# Patient Record
Sex: FEMALE | Race: WHITE | NOT HISPANIC OR LATINO | Employment: OTHER | URBAN - METROPOLITAN AREA
[De-identification: names, ages, dates, MRNs, and addresses within clinical notes are randomized per-mention and may not be internally consistent; named-entity substitution may affect disease eponyms.]

---

## 2017-01-12 ENCOUNTER — TELEPHONE (OUTPATIENT)
Dept: GASTROENTEROLOGY | Facility: CLINIC | Age: 72
End: 2017-01-12

## 2017-01-12 NOTE — TELEPHONE ENCOUNTER
Spoke with patient's daughter. EUS canceled from 1/19 due to a family illness. Did not want to reschedule at this time. Will call back to schedule.

## 2017-01-17 ENCOUNTER — PATIENT MESSAGE (OUTPATIENT)
Dept: HEPATOLOGY | Facility: CLINIC | Age: 72
End: 2017-01-17

## 2017-01-24 ENCOUNTER — TELEPHONE (OUTPATIENT)
Dept: HEPATOLOGY | Facility: CLINIC | Age: 72
End: 2017-01-24

## 2017-01-24 NOTE — TELEPHONE ENCOUNTER
"Please attempt to call and/or daughter. Pt needs to do labs locally to check her kidney functioning. I have attempted to call and send my [facility] msg that has not been read.    "Has she gone to do her repeat labs to check her kidney functioning? If not, we need to get that done asap. Please let me know if you went do lab and where so we can get the results."    Pt needs to go do labs if she has not already and if she has please call for these results.  "

## 2017-01-25 ENCOUNTER — PATIENT MESSAGE (OUTPATIENT)
Dept: HEPATOLOGY | Facility: CLINIC | Age: 72
End: 2017-01-25

## 2017-01-25 NOTE — TELEPHONE ENCOUNTER
Please tell pt that it is very important for her to go do her labs to check her kidney functioning. She can go into kidney failure possibly if we don't monitor this. I want make sure her levels are better. Please go do lab this week.

## 2017-01-26 ENCOUNTER — TELEPHONE (OUTPATIENT)
Dept: HEPATOLOGY | Facility: CLINIC | Age: 72
End: 2017-01-26

## 2017-01-26 DIAGNOSIS — R76.8 HEPATITIS C ANTIBODY TEST POSITIVE: ICD-10-CM

## 2017-01-26 DIAGNOSIS — R60.0 BILATERAL LEG EDEMA: ICD-10-CM

## 2017-01-26 DIAGNOSIS — E88.09 HYPOALBUMINEMIA: ICD-10-CM

## 2017-01-26 DIAGNOSIS — L03.90 CELLULITIS, UNSPECIFIED CELLULITIS SITE: ICD-10-CM

## 2017-01-26 DIAGNOSIS — E85.4 CUTANEOUS AMYLOIDOSIS: ICD-10-CM

## 2017-01-26 DIAGNOSIS — R18.8 OTHER ASCITES: ICD-10-CM

## 2017-01-26 DIAGNOSIS — K76.6 PORTAL HYPERTENSION: ICD-10-CM

## 2017-01-26 DIAGNOSIS — J90 PLEURAL EFFUSION, RIGHT: ICD-10-CM

## 2017-01-26 DIAGNOSIS — I50.32 CHRONIC DIASTOLIC CHF (CONGESTIVE HEART FAILURE): ICD-10-CM

## 2017-01-26 DIAGNOSIS — K74.60 CIRRHOSIS OF LIVER WITHOUT ASCITES, UNSPECIFIED HEPATIC CIRRHOSIS TYPE: ICD-10-CM

## 2017-01-26 DIAGNOSIS — K74.3 PBC (PRIMARY BILIARY CIRRHOSIS): ICD-10-CM

## 2017-01-26 DIAGNOSIS — L99 CUTANEOUS AMYLOIDOSIS: ICD-10-CM

## 2017-01-26 NOTE — TELEPHONE ENCOUNTER
----- Message from Maribell Dawson sent at 1/25/2017  7:55 PM CST -----  Contact: Pt's son, Jarrod  Pt's son, Jarrod is calling to figure out lab work between [facility] and PCP.  If orders need to be sent over to PCP and labs can be done in [place], orders can be faxed to [phone].    Jarrod can be reached at [phone].  Thank You

## 2017-01-26 NOTE — TELEPHONE ENCOUNTER
Spoke with Aissatou from pt's PCP office. She confirmed fax received (pt office notes, lab results and orders to get labs done).

## 2017-01-27 RX ORDER — SPIRONOLACTONE 100 MG/1
TABLET, FILM COATED ORAL
Qty: 30 TABLET | Refills: 2 | Status: SHIPPED | OUTPATIENT
Start: 2017-01-27 | End: 2017-03-27 | Stop reason: SDUPTHER

## 2017-01-30 ENCOUNTER — PATIENT MESSAGE (OUTPATIENT)
Dept: HEPATOLOGY | Facility: CLINIC | Age: 72
End: 2017-01-30

## 2017-02-06 ENCOUNTER — DOCUMENTATION ONLY (OUTPATIENT)
Dept: HEPATOLOGY | Facility: CLINIC | Age: 72
End: 2017-02-06

## 2017-02-06 NOTE — PROGRESS NOTES
Outside labs dated 1/31/17:    CR 1.06  K 4.7    Pt notified via [facility] that her Cr was back to normal.

## 2017-03-02 ENCOUNTER — TELEPHONE (OUTPATIENT)
Dept: GASTROENTEROLOGY | Facility: CLINIC | Age: 72
End: 2017-03-02

## 2017-03-07 ENCOUNTER — TELEPHONE (OUTPATIENT)
Dept: GASTROENTEROLOGY | Facility: CLINIC | Age: 72
End: 2017-03-07

## 2017-03-07 NOTE — TELEPHONE ENCOUNTER
Spoke with patient's daughter in regards to EUS. She does not wish to pursue EUS at this time. She has followed up with her local MD and was told everything was fine. Will call if they decide in the future to have EUS

## 2017-03-27 ENCOUNTER — OFFICE VISIT (OUTPATIENT)
Dept: HEPATOLOGY | Facility: CLINIC | Age: 72
End: 2017-03-27
Payer: MEDICARE

## 2017-03-27 ENCOUNTER — HOSPITAL ENCOUNTER (OUTPATIENT)
Dept: RADIOLOGY | Facility: HOSPITAL | Age: 72
Discharge: HOME | End: 2017-03-27
Attending: NURSE PRACTITIONER
Payer: MEDICARE

## 2017-03-27 VITALS
WEIGHT: 147 LBS | BODY MASS INDEX: 23.63 KG/M2 | RESPIRATION RATE: 20 BRPM | HEIGHT: 66 IN | DIASTOLIC BLOOD PRESSURE: 63 MMHG | SYSTOLIC BLOOD PRESSURE: 130 MMHG | OXYGEN SATURATION: 97 % | HEART RATE: 62 BPM

## 2017-03-27 DIAGNOSIS — E85.4 CUTANEOUS AMYLOIDOSIS: ICD-10-CM

## 2017-03-27 DIAGNOSIS — K74.3 PBC (PRIMARY BILIARY CIRRHOSIS): ICD-10-CM

## 2017-03-27 DIAGNOSIS — K74.60 CIRRHOSIS OF LIVER WITH ASCITES, UNSPECIFIED HEPATIC CIRRHOSIS TYPE: ICD-10-CM

## 2017-03-27 DIAGNOSIS — E88.09 HYPOALBUMINEMIA: ICD-10-CM

## 2017-03-27 DIAGNOSIS — K76.6 PORTAL HYPERTENSION: ICD-10-CM

## 2017-03-27 DIAGNOSIS — K86.2 PANCREATIC CYST: ICD-10-CM

## 2017-03-27 DIAGNOSIS — K74.60 CIRRHOSIS OF LIVER WITHOUT ASCITES, UNSPECIFIED HEPATIC CIRRHOSIS TYPE: Primary | ICD-10-CM

## 2017-03-27 DIAGNOSIS — R18.8 CIRRHOSIS OF LIVER WITH ASCITES, UNSPECIFIED HEPATIC CIRRHOSIS TYPE: ICD-10-CM

## 2017-03-27 DIAGNOSIS — R76.8 HEPATITIS C ANTIBODY TEST POSITIVE: ICD-10-CM

## 2017-03-27 DIAGNOSIS — R18.8 OTHER ASCITES: ICD-10-CM

## 2017-03-27 DIAGNOSIS — I85.10 SECONDARY ESOPHAGEAL VARICES WITHOUT BLEEDING: ICD-10-CM

## 2017-03-27 DIAGNOSIS — R60.0 BILATERAL LEG EDEMA: ICD-10-CM

## 2017-03-27 DIAGNOSIS — K76.82 HEPATIC ENCEPHALOPATHY: ICD-10-CM

## 2017-03-27 DIAGNOSIS — L99 CUTANEOUS AMYLOIDOSIS: ICD-10-CM

## 2017-03-27 DIAGNOSIS — I86.4 GASTRIC VARICES: ICD-10-CM

## 2017-03-27 PROCEDURE — 99999 PR PBB SHADOW E&M-EST. PATIENT-LVL III: CPT | Mod: PBBFAC,,, | Performed by: NURSE PRACTITIONER

## 2017-03-27 PROCEDURE — 76700 US EXAM ABDOM COMPLETE: CPT | Mod: TC

## 2017-03-27 PROCEDURE — 99214 OFFICE O/P EST MOD 30 MIN: CPT | Mod: S$GLB,,, | Performed by: NURSE PRACTITIONER

## 2017-03-27 PROCEDURE — 76700 US EXAM ABDOM COMPLETE: CPT | Mod: 26,,, | Performed by: RADIOLOGY

## 2017-03-27 RX ORDER — FUROSEMIDE 40 MG/1
40 TABLET ORAL 2 TIMES DAILY
Qty: 60 TABLET | Refills: 5 | Status: SHIPPED | OUTPATIENT
Start: 2017-03-27 | End: 2017-09-28 | Stop reason: SDUPTHER

## 2017-03-27 RX ORDER — SPIRONOLACTONE 100 MG/1
100 TABLET, FILM COATED ORAL DAILY
Qty: 30 TABLET | Refills: 5 | Status: SHIPPED | OUTPATIENT
Start: 2017-03-27 | End: 2017-10-19 | Stop reason: SDUPTHER

## 2017-03-27 RX ORDER — URSODIOL 500 MG/1
500 TABLET, FILM COATED ORAL 2 TIMES DAILY
Qty: 60 TABLET | Refills: 11 | Status: SHIPPED | OUTPATIENT
Start: 2017-03-27 | End: 2018-03-27

## 2017-03-27 RX ORDER — LACTULOSE 10 G/15ML
20 SOLUTION ORAL 3 TIMES DAILY
Qty: 8100 ML | Refills: 3 | Status: SHIPPED | OUTPATIENT
Start: 2017-03-27

## 2017-03-27 NOTE — PROGRESS NOTES
[facility] Established Patient Visit    Reason for Visit:  F/u PBC, cirrhosis    PCP: Suresh Espana    HPI:  This is a 71 y.o. 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 She is here with her son and daughter today. She has been decompensated with fluid retention that is stable and well-controlled on lasix 40 mg BID and spironolactone 100 mg daily. She developed hepatic encephalopathy requiring hospitalization in 5/2016 and was started on lactulose and Xifaxan and her encephalopathy remains well-controlled. She is on ursodiol 500 mg BID. Her alk phos and transaminases remain mildly elevated though. MELD today is 10. She had labs and u/s today for HCC surveillance. U/s showed no liver masses but she does have 2 liver cysts. She has pancreatic cysts also and was supposed to undergo EUS for these but elected to defer procedure for now. One new pancreas cyst in panc head seen today.     Her EGD on 7/26/16 showed Class 1 varices starting at 30 cm. Mild gastric varices seen also. Esophageal varices not banded d/t presence of gastric varices. She will need repeat EGD in 7/2016 so we can do EUS and EGD together. She is on Coreg 6.25 mg BID. She has osteopenia and is on Ca and vit D. She has been vaccinated for hep B and is immune to hep A per labs.      MELD-Na score: 10 at 3/27/2017  9:50 AM  MELD score: 10 at 3/27/2017  9:50 AM  Calculated from:  Serum Creatinine: 1.2 mg/dL at 3/27/2017  9:50 AM  Serum Sodium: 142 mmol/L (Rounded to 137) at 3/27/2017  9:50 AM  Total Bilirubin: 1.2 mg/dL at 3/27/2017  9:50 AM  INR(ratio): 1.1 at 3/27/2017  9:50 AM  Age: 71 years    She feels well today. Denies jaundice, dark urine, hematemesis, melena, slowed mentation, abdominal distention.     ROS:   GENERAL: Denies fever, chills, weight change, fatigue  HEENT: Denies headaches, dizziness, vision/hearing changes  CARDIOVASCULAR: Denies chest pain, palpitations, edema  RESPIRATORY: Denies dyspnea,  "cough  GI: Denies abdominal pain, rectal bleeding, nausea, vomiting. 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    PSHX:  has a past surgical history that includes Tonsillectomy; Incisional hernia repair; Hysterectomy; breast implant; Colonoscopy; and Upper gastrointestinal endoscopy.    The patient's social and family histories were reviewed by me and updated in the appropriate section of the electronic medical record.    Review of patient's allergies indicates:   Allergen Reactions    Bactrim [sulfamethoxazole-trimethoprim] Rash       Medications reviewed in Epic      Objective Findings:    PHYSICAL EXAM:   Friendly White female, in no acute distress; alert and oriented to person, place and time  VITALS:   /63 (BP Location: Right arm, Patient Position: Sitting, BP Method: Automatic)  Pulse 62  Resp 20  Ht 5' 6" (1.676 m)  Wt 66.7 kg (147 lb)  SpO2 97%  BMI 23.73 kg/m2  HENT: Normocephalic, without obvious abnormality. Oral mucosa pink and moist. Dentition good.  EYES: Sclerae anicteric. No conjunctival pallor.   CARDIOVASCULAR: Regular rate and rhythm. No murmurs.  RESPIRATORY: Normal respiratory effort. BBS CTA. No wheezes or crackles.  GI: Soft, non-tender, non-distended. No hepatosplenomegaly. No masses palpable. No ascites.  EXTREMITIES:  No clubbing, cyanosis or edema.  SKIN: Warm and dry. No jaundice. No rashes noted to exposed skin. (+) telangectasias noted. (+) palmar erythema.  NEURO:  Normal gate. (+) mild tremor.  PSYCH:  Memory intact. Thought and speech pattern appropriate. Behavior normal. No depression or anxiety " noted.      Labs:  Lab Results   Component Value Date    WBC 6.91 03/27/2017    HGB 13.2 03/27/2017    HCT 37.6 03/27/2017    PLT 79 (L) 03/27/2017    CHOL 113 (L) 04/21/2016    TRIG 86 04/21/2016    HDL 15 (L) 04/21/2016     03/27/2017    K 4.5 03/27/2017    CREATININE 1.2 03/27/2017    ALT 34 03/27/2017    AST 45 (H) 03/27/2017    ALKPHOS 146 (H) 03/27/2017    BILITOT 1.2 (H) 03/27/2017    ALBUMIN 3.2 (L) 03/27/2017    INR 1.1 03/27/2017    AFP 2.2 03/27/2017       ASSESSMENT:  71 y.o. White female with:  1.  Cirrhosis, decompensated but stable on meds  -- MELD= MELD-Na score: 10 at 3/27/2017  9:50 AM  MELD score: 10 at 3/27/2017  9:50 AM  Calculated from:  Serum Creatinine: 1.2 mg/dL at 3/27/2017  9:50 AM  Serum Sodium: 142 mmol/L (Rounded to 137) at 3/27/2017  9:50 AM  Total Bilirubin: 1.2 mg/dL at 3/27/2017  9:50 AM  INR(ratio): 1.1 at 3/27/2017  9:50 AM  Age: 71 years  -- HCC screening- AFP and abd. U/S- up to date, next due 9/2017  -- Immunity to Hep A and B- (+) HAV immunity, s/p HBV vaccines  -- EGD- up to date, see below  2.  PBC  -- on ursodiol 500 mg BID  -- liver biopsy: none  -- liver enzymes: alk phos and transaminases remain mildly elevated  -- AMA (+) 1:320  -- Dexa scan 6/2016 - osteopenia  3. Portal hypertension  -- EGD- 7/26/16 - Class 1 varices starting at 30 cm. Mild gastric varices seen also. Esophageal varices not banded d/t presence of gastric varices. Repeat in one year, on Coreg 6.25 mg BID  -- Ascites, ankle edema- on lasix 40 mg BID and spironolactone 100 mg daily, stable  -- Splenomegaly and thrombocytopenia  4. Hepatic encephalopathy, stable on lactulose and Xifaxan   5. Amyloidosis  -- only skin involvement per pt and family, evaluated with outside hospital  6. Hepatitis C Ab (+)/grayzone  -- HCV RNA negative indicating no active/chronic infection  7. Pancreatic cysts  -- was scheduled for EUS in 1/2017 but pt deferred this      EDUCATION:   Discussed management, prognosis, and  pathophysiology of PBC.   -- Avoid alcohol. Avoid raw seafood.   -- Natural history of PBC including progression to cirrhosis reviewed.   -- Discussed potential outcomes of cirrhosis, including liver cancer, liver failure, liver transplant, death.   -- Limit acetaminophen to 2000mg daily.  -- Discussed increased incidence of bone loss/osteoporosis with PBC. Recommend adequate Ca and vitamin D supplementation (Ca 7894-4546 mg and vitamin D 800-1000 mg daily).     The disease process and manifestations of cirrhosis were discussed.    Signs and symptoms of hepatic decompensation were reviewed, including jaundice, ascites, and slowed mentation due to hepatic encephalopathy. The patient should seek medical attention if any of these things occur. We discussed the potential for bleeding from esophageal varices with symptoms of hematemesis and melena. The patient should report to the Emergency Department for these symptoms.    We discussed the increased risk of hepatocellular carcinoma due to cirrhosis. Continued screening every six months with ultrasound and AFP is recommended.   No alcohol or raw seafood.    Tylenol/acetaminophen as needed for pain, up to 2000 mg daily    Discussed use of the MELD score and its role in the management and determining the prognosis of cirrhosis. Currently MELD score is not high enough to warrant transplant evaluation. Will continue to monitor.       PLAN:  1. Refilled lasix, spironolactone, lactulose, ursodiol  2. Continue same doses of all meds  3. HCC screening Q 6 months with AFP and abd. U/S, next due 9/2017  4. Repeat EGD in 7/2017. Will do EUS with EGD for her panc cysts and varices screening   5. Continue calcium and vit D supplementation  6. Repeat Dexa 6/2018  7. F/u in 7/2017 either same day with EUS/EGD or one day before or after       Thank you for allowing me to participate in the care of Andie Bills    Jenny Quinoens, NP-C     CC: Dr. Rich Paniagua, [facility]  LAURI Espana MD, PCP  Dr. Kalin Ambrosio, GI

## 2017-03-30 ENCOUNTER — TELEPHONE (OUTPATIENT)
Dept: GASTROENTEROLOGY | Facility: CLINIC | Age: 72
End: 2017-03-30

## 2017-04-05 ENCOUNTER — TELEPHONE (OUTPATIENT)
Dept: GASTROENTEROLOGY | Facility: CLINIC | Age: 72
End: 2017-04-05

## 2017-04-24 ENCOUNTER — TELEPHONE (OUTPATIENT)
Dept: GASTROENTEROLOGY | Facility: CLINIC | Age: 72
End: 2017-04-24

## 2017-05-01 ENCOUNTER — TELEPHONE (OUTPATIENT)
Dept: GASTROENTEROLOGY | Facility: CLINIC | Age: 72
End: 2017-05-01

## 2017-05-09 ENCOUNTER — TELEPHONE (OUTPATIENT)
Dept: GASTROENTEROLOGY | Facility: CLINIC | Age: 72
End: 2017-05-09

## 2017-05-11 ENCOUNTER — TELEPHONE (OUTPATIENT)
Dept: GASTROENTEROLOGY | Facility: CLINIC | Age: 72
End: 2017-05-11

## 2017-06-25 ENCOUNTER — PATIENT MESSAGE (OUTPATIENT)
Dept: HEPATOLOGY | Facility: CLINIC | Age: 72
End: 2017-06-25

## 2017-06-27 ENCOUNTER — TELEPHONE (OUTPATIENT)
Dept: ENDOSCOPY | Facility: HOSPITAL | Age: 72
End: 2017-06-27

## 2017-06-27 DIAGNOSIS — I85.00 ESOPHAGEAL VARICES WITHOUT BLEEDING, UNSPECIFIED ESOPHAGEAL VARICES TYPE: Primary | ICD-10-CM

## 2017-07-17 ENCOUNTER — PATIENT MESSAGE (OUTPATIENT)
Dept: HEPATOLOGY | Facility: CLINIC | Age: 72
End: 2017-07-17

## 2017-07-25 ENCOUNTER — TELEPHONE (OUTPATIENT)
Dept: HEPATOLOGY | Facility: CLINIC | Age: 72
End: 2017-07-25

## 2017-07-25 NOTE — TELEPHONE ENCOUNTER
----- Message from Leelee Raudel sent at 7/25/2017  8:42 AM CDT -----  Contact: daughter/Leelee  Very swollen all over body, and she can hardly walk daughter needs a call back so she can figure out what to do, please call her @ # [phone] she has a procedure tomorrow does not think she will be able to do it.

## 2017-07-25 NOTE — TELEPHONE ENCOUNTER
Returned call spoke with patient's daughter Leelee. Leelee says her mother has swelling in her abdomen and legs. Says she saw her doctor today and had an ABD  U/S and labs done. Leelee says she informed the doctor that the patient will coming to [facility] on tomorrow 7/26/17 for procedure and no further test or procedures were done. Also says she may have to cancel her mother's appointment.  Asked Leelee if patient is still having swelling, if she has any pain or SOB. Patient's daughter says her mother still has swelling and a little SOB, but she 's not in any pain.  Informed daughter that she should bring her mother to the ER to be assessed. Also attempted to give her the phone number for the Endoscopy Department  In case she needed to cancel the appointment. Leelee says she can not take the phone number because she's driving. Informed Leelee that I can call her number and leave phone number on answering machine.   Leelee verbalizes understanding, says thank you . Leelee's cell phone called and left phone number for the Endoscopy Department.

## 2017-07-26 ENCOUNTER — TELEPHONE (OUTPATIENT)
Dept: HEPATOLOGY | Facility: CLINIC | Age: 72
End: 2017-07-26

## 2017-07-26 ENCOUNTER — TELEPHONE (OUTPATIENT)
Dept: ENDOSCOPY | Facility: HOSPITAL | Age: 72
End: 2017-07-26

## 2017-07-26 NOTE — TELEPHONE ENCOUNTER
Called and spoke with patient's daughter Leelee. Informed her that I sent a message to provider regarding her mother's symptoms. Relayed message from KRISTIN Mccurdy, informed her that provider would like patient to go to ER to be assessed, and she will call her when she return to clinic on next week.   Patient's daughter Leelee says she brought her mother to the ER this morning. And they are going to admit her, asked Leelee if she knew what diagnosis they were admitting her with. Leelee says no they are admitting her now.

## 2017-07-26 NOTE — TELEPHONE ENCOUNTER
Pt daughter called and stated Pt was admitted to hospital and EGD/EUS is to be canceled for 7/26/17 at 1300, Caren RN charge nurse on 2nd floor endoscopy notified.

## 2017-07-31 NOTE — TELEPHONE ENCOUNTER
Called and spoke with pt. She reports her swelling is much better since being hospitalized. Attempted to call pt's daughter. No answer. Left VM.    Pt is due for f/u appt with me with labs for CBC, CMP, INR. Please call to get these scheduled.

## 2017-08-01 NOTE — TELEPHONE ENCOUNTER
MA called patient, able to speak to the cleaning lady in her house saying she is outside walking. Left her message to have the patient or daughter call us back to schedule her follow up visit and labs. QUENTIN

## 2017-08-02 NOTE — TELEPHONE ENCOUNTER
MA called patient again, schedule her labs and follow up visit 8/11 mailed appt reminder to patient. Christophe

## 2017-08-14 ENCOUNTER — TELEPHONE (OUTPATIENT)
Dept: ENDOSCOPY | Facility: HOSPITAL | Age: 72
End: 2017-08-14

## 2017-08-14 ENCOUNTER — OFFICE VISIT (OUTPATIENT)
Dept: HEPATOLOGY | Facility: CLINIC | Age: 72
End: 2017-08-14
Payer: MEDICARE

## 2017-08-14 ENCOUNTER — LAB VISIT (OUTPATIENT)
Dept: LAB | Facility: HOSPITAL | Age: 72
End: 2017-08-14
Payer: MEDICARE

## 2017-08-14 VITALS
TEMPERATURE: 97 F | HEIGHT: 66 IN | SYSTOLIC BLOOD PRESSURE: 137 MMHG | WEIGHT: 155.63 LBS | BODY MASS INDEX: 25.01 KG/M2 | DIASTOLIC BLOOD PRESSURE: 60 MMHG | HEART RATE: 73 BPM | RESPIRATION RATE: 18 BRPM | OXYGEN SATURATION: 97 %

## 2017-08-14 DIAGNOSIS — M25.473 ANKLE EDEMA: ICD-10-CM

## 2017-08-14 DIAGNOSIS — K74.3 PRIMARY BILIARY CHOLANGITIS: ICD-10-CM

## 2017-08-14 DIAGNOSIS — K74.60 CIRRHOSIS OF LIVER WITHOUT ASCITES, UNSPECIFIED HEPATIC CIRRHOSIS TYPE: Primary | ICD-10-CM

## 2017-08-14 DIAGNOSIS — E85.4 CUTANEOUS AMYLOIDOSIS: ICD-10-CM

## 2017-08-14 DIAGNOSIS — K74.60 CIRRHOSIS OF LIVER WITH ASCITES, UNSPECIFIED HEPATIC CIRRHOSIS TYPE: ICD-10-CM

## 2017-08-14 DIAGNOSIS — K76.82 HEPATIC ENCEPHALOPATHY: ICD-10-CM

## 2017-08-14 DIAGNOSIS — R18.8 OTHER ASCITES: ICD-10-CM

## 2017-08-14 DIAGNOSIS — L99 CUTANEOUS AMYLOIDOSIS: ICD-10-CM

## 2017-08-14 DIAGNOSIS — K76.6 PORTAL HYPERTENSION: ICD-10-CM

## 2017-08-14 DIAGNOSIS — K86.2 PANCREATIC CYST: ICD-10-CM

## 2017-08-14 DIAGNOSIS — R18.8 CIRRHOSIS OF LIVER WITH ASCITES, UNSPECIFIED HEPATIC CIRRHOSIS TYPE: ICD-10-CM

## 2017-08-14 DIAGNOSIS — J90 PLEURAL EFFUSION, RIGHT: ICD-10-CM

## 2017-08-14 LAB
AFP SERPL-MCNC: 2 NG/ML
ALBUMIN SERPL BCP-MCNC: 2.7 G/DL
ALP SERPL-CCNC: 149 U/L
ALT SERPL W/O P-5'-P-CCNC: 39 U/L
ANION GAP SERPL CALC-SCNC: 9 MMOL/L
AST SERPL-CCNC: 52 U/L
BASOPHILS # BLD AUTO: 0.06 K/UL
BASOPHILS NFR BLD: 0.9 %
BILIRUB SERPL-MCNC: 1.5 MG/DL
BUN SERPL-MCNC: 56 MG/DL
CALCIUM SERPL-MCNC: 9.1 MG/DL
CHLORIDE SERPL-SCNC: 107 MMOL/L
CO2 SERPL-SCNC: 23 MMOL/L
CREAT SERPL-MCNC: 1.3 MG/DL
DIFFERENTIAL METHOD: ABNORMAL
EOSINOPHIL # BLD AUTO: 0.5 K/UL
EOSINOPHIL NFR BLD: 7.1 %
ERYTHROCYTE [DISTWIDTH] IN BLOOD BY AUTOMATED COUNT: 15.2 %
EST. GFR  (AFRICAN AMERICAN): 47 ML/MIN/1.73 M^2
EST. GFR  (NON AFRICAN AMERICAN): 40.8 ML/MIN/1.73 M^2
GLUCOSE SERPL-MCNC: 148 MG/DL
HCT VFR BLD AUTO: 35.5 %
HGB BLD-MCNC: 11.9 G/DL
INR PPP: 1.2
LYMPHOCYTES # BLD AUTO: 1 K/UL
LYMPHOCYTES NFR BLD: 15.9 %
MCH RBC QN AUTO: 33.5 PG
MCHC RBC AUTO-ENTMCNC: 33.5 G/DL
MCV RBC AUTO: 100 FL
MONOCYTES # BLD AUTO: 0.5 K/UL
MONOCYTES NFR BLD: 8 %
NEUTROPHILS # BLD AUTO: 4.3 K/UL
NEUTROPHILS NFR BLD: 68.1 %
PLATELET # BLD AUTO: 83 K/UL
PMV BLD AUTO: 12.2 FL
POTASSIUM SERPL-SCNC: 4.3 MMOL/L
PROT SERPL-MCNC: 6.7 G/DL
PROTHROMBIN TIME: 12.6 SEC
RBC # BLD AUTO: 3.55 M/UL
SODIUM SERPL-SCNC: 139 MMOL/L
WBC # BLD AUTO: 6.37 K/UL

## 2017-08-14 PROCEDURE — 1159F MED LIST DOCD IN RCRD: CPT | Mod: S$GLB,,, | Performed by: NURSE PRACTITIONER

## 2017-08-14 PROCEDURE — 3008F BODY MASS INDEX DOCD: CPT | Mod: S$GLB,,, | Performed by: NURSE PRACTITIONER

## 2017-08-14 PROCEDURE — 99214 OFFICE O/P EST MOD 30 MIN: CPT | Mod: S$GLB,,, | Performed by: NURSE PRACTITIONER

## 2017-08-14 PROCEDURE — 99999 PR PBB SHADOW E&M-EST. PATIENT-LVL IV: CPT | Mod: PBBFAC,,, | Performed by: NURSE PRACTITIONER

## 2017-08-14 PROCEDURE — 1126F AMNT PAIN NOTED NONE PRSNT: CPT | Mod: S$GLB,,, | Performed by: NURSE PRACTITIONER

## 2017-08-14 NOTE — TELEPHONE ENCOUNTER
Spoke with patient's daughter, Lorie, about rescheduling EGD/UEUS.  States she will call back today after appt w/Jenny Quinones NP in Hepatology.

## 2017-08-15 ENCOUNTER — TELEPHONE (OUTPATIENT)
Dept: ENDOSCOPY | Facility: HOSPITAL | Age: 72
End: 2017-08-15

## 2017-08-15 DIAGNOSIS — I86.4 ESOPHAGEAL AND GASTRIC VARICES: ICD-10-CM

## 2017-08-15 DIAGNOSIS — I85.00 ESOPHAGEAL AND GASTRIC VARICES: ICD-10-CM

## 2017-08-15 DIAGNOSIS — K74.60 CIRRHOSIS OF LIVER WITHOUT ASCITES, UNSPECIFIED HEPATIC CIRRHOSIS TYPE: Primary | ICD-10-CM

## 2017-08-15 NOTE — TELEPHONE ENCOUNTER
Please call pt's daughter to schedule u/s and f/u visit with me same day as her procedure at 11 AM. She will need u/s first in AM, can do lab as scheduled. I can see her before her tests or later afternoon, whichever family prefers. Ok to use AM slot if pt wants to be seen early.

## 2017-08-15 NOTE — TELEPHONE ENCOUNTER
Patient's daughter, Lorie, called back.  EGD/UEUS scheduled 9/6/17 at 1100.  CBC, PT/INR scheduled before procedure (liver cirrhosis, esoph/gastric varices).  Instructions emailed.

## 2017-08-16 NOTE — TELEPHONE ENCOUNTER
MA called patient daughter schedule her Ultrasound and follow up visit the same of her procedure 9/6. Mailed appt reminder to patient. QUENTIN

## 2017-09-07 ENCOUNTER — TELEPHONE (OUTPATIENT)
Dept: ENDOSCOPY | Facility: HOSPITAL | Age: 72
End: 2017-09-07
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
## 2017-09-28 DIAGNOSIS — R18.8 CIRRHOSIS OF LIVER WITH ASCITES, UNSPECIFIED HEPATIC CIRRHOSIS TYPE: ICD-10-CM

## 2017-09-28 DIAGNOSIS — K74.60 CIRRHOSIS OF LIVER WITH ASCITES, UNSPECIFIED HEPATIC CIRRHOSIS TYPE: ICD-10-CM

## 2017-09-28 RX ORDER — FUROSEMIDE 40 MG/1
40 TABLET ORAL 2 TIMES DAILY
Qty: 60 TABLET | Refills: 5 | Status: SHIPPED | OUTPATIENT
Start: 2017-09-28

## 2017-10-19 ENCOUNTER — TELEPHONE (OUTPATIENT)
Dept: HEPATOLOGY | Facility: CLINIC | Age: 72
End: 2017-10-19

## 2017-10-19 DIAGNOSIS — L99 CUTANEOUS AMYLOIDOSIS: ICD-10-CM

## 2017-10-19 DIAGNOSIS — R18.8 OTHER ASCITES: ICD-10-CM

## 2017-10-19 DIAGNOSIS — K76.6 PORTAL HYPERTENSION: ICD-10-CM

## 2017-10-19 DIAGNOSIS — K74.60 CIRRHOSIS OF LIVER WITHOUT ASCITES, UNSPECIFIED HEPATIC CIRRHOSIS TYPE: ICD-10-CM

## 2017-10-19 DIAGNOSIS — E85.4 CUTANEOUS AMYLOIDOSIS: ICD-10-CM

## 2017-10-19 DIAGNOSIS — E88.09 HYPOALBUMINEMIA: ICD-10-CM

## 2017-10-19 DIAGNOSIS — R60.0 BILATERAL LEG EDEMA: ICD-10-CM

## 2017-10-19 RX ORDER — SPIRONOLACTONE 100 MG/1
100 TABLET, FILM COATED ORAL DAILY
Qty: 30 TABLET | Refills: 5 | Status: SHIPPED | OUTPATIENT
Start: 2017-10-19

## 2017-10-19 RX ORDER — SPIRONOLACTONE 100 MG/1
100 TABLET, FILM COATED ORAL DAILY
Qty: 30 TABLET | Refills: 5 | Status: CANCELLED | OUTPATIENT
Start: 2017-10-19

## 2017-10-19 NOTE — TELEPHONE ENCOUNTER
----- Message from Mago Tello sent at 10/19/2017  9:30 AM CDT -----  Contact: Pharmacy   Refill spironolactone (ALDACTONE) 100 MG tablet      Pharmacy

## 2017-10-24 ENCOUNTER — TELEPHONE (OUTPATIENT)
Dept: ENDOSCOPY | Facility: HOSPITAL | Age: 72
End: 2017-10-24

## 2017-12-06 ENCOUNTER — PATIENT MESSAGE (OUTPATIENT)
Dept: HEPATOLOGY | Facility: CLINIC | Age: 72
End: 2017-12-06

## 2017-12-19 NOTE — PROGRESS NOTES
[facility] Established Patient Visit    Reason for Visit:  F/u PBC, cirrhosis    PCP: Suresh Espana    HPI:  This is a 71 y.o. 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 She is here with her son and daughter today. She has been decompensated with fluid retention that is stable and well-controlled on lasix 40 mg BID and spironolactone 100 mg daily. She developed hepatic encephalopathy requiring hospitalization in 5/2016 and was started on lactulose and Xifaxan and her encephalopathy remains well-controlled. She is on ursodiol 500 mg BID. Her alk phos and transaminases remain mildly elevated though.     Since her last visit, she was hospitalized at local hospital for swelling. Family reports they did not change her diuretics doses. She did not require paracentesis. Daughter thought she was very swollen though. Her weight is up 8 lbs since last visit. Records reviewed under care everywhere. U/s on 7/21/17 showed cirrhotic liver, no ascites, and right pleural effusion. No masses in liver. Her labs on 7/26 showed Cr 1.3, K 4.6. LFT's stable. She c/o leg cramps. Has tried drinking pickle juice and this relieves the cramps.     She is due for repeat EGD for varices surveillance and EUS for panc cysts surveillance. Her EGD on 7/26/16 showed Class 1 varices starting at 30 cm. Mild gastric varices seen also. Esophageal varices not banded d/t presence of gastric varices. She is on Coreg 6.25 mg BID. She has osteopenia and is on Ca and vit D.     She thinks her abd is swollen today and her legs are also swollen. Denies jaundice, dark urine, hematemesis, melena, slowed mentation, abdominal distention.       MELD-Na score: 13 at 8/14/2017  1:57 PM  MELD score: 13 at 8/14/2017  1:57 PM  Calculated from:  Serum Creatinine: 1.3 mg/dL at 8/14/2017  1:57 PM  Serum Sodium: 139 mmol/L (Rounded to 137) at 8/14/2017  1:57 PM  Total Bilirubin: 1.5 mg/dL at 8/14/2017  1:57 PM  INR(ratio): 1.2 at 8/14/2017   "1:57 PM  Age: 71 years    ROS:   GENERAL: Denies fever, chills, (+) weight gain, (+) fatigue  HEENT: Denies headaches, dizziness, vision/hearing changes  CARDIOVASCULAR: Denies chest pain, palpitations, (+) edema  RESPIRATORY: (+) dyspnea, no cough  GI: Denies abdominal pain, rectal bleeding, nausea, vomiting. 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    PSHX:  has a past surgical history that includes Tonsillectomy; Incisional hernia repair; Hysterectomy; breast implant; Colonoscopy; and Upper gastrointestinal endoscopy.    The patient's social and family histories were reviewed by me and updated in the appropriate section of the electronic medical record.    Review of patient's allergies indicates:   Allergen Reactions    Bactrim [sulfamethoxazole-trimethoprim] Rash       Medications reviewed in Epic      Objective Findings:    PHYSICAL EXAM:   Friendly White female, in no acute distress; alert and oriented to person, place and time  VITALS:   /60 (BP Location: Left arm, Patient Position: Sitting)   Pulse 73   Temp 96.5 °F (35.8 °C) (Oral)   Resp 18   Ht 5' 6" (1.676 m)   Wt 70.6 kg (155 lb 10.3 oz)   SpO2 97%   BMI 25.12 kg/m²   HENT: Normocephalic, without obvious abnormality. Oral mucosa pink and moist. Dentition good.  EYES: Sclerae anicteric. No conjunctival pallor.   CARDIOVASCULAR: Regular rate and rhythm. No murmurs.  RESPIRATORY: Normal respiratory effort. BBS CTA. No wheezes or crackles.  GI: Soft, non-tender, non-distended. No hepatosplenomegaly. No masses palpable. No ascites.  EXTREMITIES:  No clubbing, cyanosis, (+) 2 " BLE edema.  SKIN: Warm and dry. No jaundice. No rashes noted to exposed skin. (+) telangectasias noted. (+) palmar erythema.  NEURO:  Normal gate. No asterixis.    PSYCH:  Memory intact. Thought and speech pattern appropriate. Behavior normal. No depression or anxiety noted.      Labs:  Lab Results   Component Value Date    WBC 6.37 08/14/2017    HGB 11.9 (L) 08/14/2017    HCT 35.5 (L) 08/14/2017    PLT 83 (L) 08/14/2017    CHOL 113 (L) 04/21/2016    TRIG 86 04/21/2016    HDL 15 (L) 04/21/2016     08/14/2017    K 4.3 08/14/2017    CREATININE 1.3 08/14/2017    ALT 39 08/14/2017    AST 52 (H) 08/14/2017    ALKPHOS 149 (H) 08/14/2017    BILITOT 1.5 (H) 08/14/2017    ALBUMIN 2.7 (L) 08/14/2017    INR 1.2 08/14/2017    AFP 2.2 03/27/2017       ASSESSMENT:  71 y.o. White female with:  1.  Cirrhosis, decompensated but stable on meds  -- MELD= MELD-Na score: 13 at 8/14/2017  1:57 PM  MELD score: 13 at 8/14/2017  1:57 PM  Calculated from:  Serum Creatinine: 1.3 mg/dL at 8/14/2017  1:57 PM  Serum Sodium: 139 mmol/L (Rounded to 137) at 8/14/2017  1:57 PM  Total Bilirubin: 1.5 mg/dL at 8/14/2017  1:57 PM  INR(ratio): 1.2 at 8/14/2017  1:57 PM  Age: 71 years  -- HCC screening- AFP and abd. U/S- up to date, next due 9/2017  -- Immunity to Hep A and B- (+) HAV immunity, s/p HBV vaccines  -- EGD- due now  2.  PBC  -- on ursodiol 500 mg BID  -- liver biopsy: none  -- liver enzymes: alk phos and transaminases remain mildly elevated  -- AMA (+) 1:320  -- Dexa scan 6/2016 - osteopenia  3. Portal hypertension  -- EGD- 7/26/16 - Class 1 varices starting at 30 cm. Mild gastric varices seen also. Esophageal varices not banded d/t presence of gastric varices. On Coreg 6.25 mg BID  -- Ascites, ankle edema- on lasix 40 mg BID and spironolactone 100 mg daily, ankle edema worse today  -- Splenomegaly and thrombocytopenia  4. Hepatic encephalopathy, stable on lactulose and Xifaxan   5. Amyloidosis  -- only skin involvement per pt and  family, evaluated with outside hospital  6. Hepatitis C Ab (+)/grayzone  -- HCV RNA negative indicating no active/chronic infection  7. Pancreatic cysts  -- was scheduled for EUS in 1/2017 but pt deferred this, will get this arranged  8. Ankle edema  -- no ascites on recent outside u/s. Would like for her to try to elevated her legs, wear compression stockings, and stop drinking pickle juice (only did this twice) before increasing her diureitcs      EDUCATION:   Discussed management, prognosis, and pathophysiology of PBC.   -- Avoid alcohol. Avoid raw seafood.   -- Natural history of PBC including progression to cirrhosis reviewed.   -- Discussed potential outcomes of cirrhosis, including liver cancer, liver failure, liver transplant, death.   -- Limit acetaminophen to 2000mg daily.  -- Discussed increased incidence of bone loss/osteoporosis with PBC. Recommend adequate Ca and vitamin D supplementation (Ca 9508-6674 mg and vitamin D 800-1000 mg daily).     The disease process and manifestations of cirrhosis were discussed.    Signs and symptoms of hepatic decompensation were reviewed, including jaundice, ascites, and slowed mentation due to hepatic encephalopathy. The patient should seek medical attention if any of these things occur. We discussed the potential for bleeding from esophageal varices with symptoms of hematemesis and melena. The patient should report to the Emergency Department for these symptoms.    We discussed the increased risk of hepatocellular carcinoma due to cirrhosis. Continued screening every six months with ultrasound and AFP is recommended.   No alcohol or raw seafood.    Tylenol/acetaminophen as needed for pain, up to 2000 mg daily    Discussed use of the MELD score and its role in the management and determining the prognosis of cirrhosis. Currently MELD score is not high enough to warrant transplant evaluation. Will continue to monitor.       PLAN:  1. Refilled lactulose  2. Wear  compression stocking for BLE edema  3. Low Na diet, < 2 grams daily. No pickle juice  4. Elevated legs when sitting or lying down   5. EGD and EUS to be coordinated  6. If leg swelling persists besides these interventions, can consider increasing diuretics  7. HCC screening Q 6 months with AFP and abd. U/S, next due 9/2017 (recent outside u/s showed no masses but will repeat here)  8. Continue calcium and vit D supplementation  9. Repeat Dexa 6/2018  10. Consider adding Ocaliva since she has not had normalization of alk phos yet despite treatment with ursodiol x past year. Will discuss at next cisit  11. F/u in 9/2017 with EUS/EGD, labs, and abd u/s same day      Thank you for allowing me to participate in the care of Andie Bills    Jenny Quinones, NP-C     CC: Dr. Rich Paniagua, pulmonary  Suresh Espana MD, PCP  Dr. Kalin Ambrosio, GI        6

## 2018-04-19 ENCOUNTER — PATIENT MESSAGE (OUTPATIENT)
Dept: HEPATOLOGY | Facility: CLINIC | Age: 73
End: 2018-04-19

## 2018-04-20 ENCOUNTER — TELEPHONE (OUTPATIENT)
Dept: HEPATOLOGY | Facility: CLINIC | Age: 73
End: 2018-04-20

## 2018-04-20 NOTE — TELEPHONE ENCOUNTER
----- Message from Jenny Quinones NP sent at 2018  9:29 AM CDT -----  Please notify HIM, pt is .     Thanks,  Jenny